# Patient Record
Sex: MALE | Race: OTHER | NOT HISPANIC OR LATINO | Employment: FULL TIME | URBAN - METROPOLITAN AREA
[De-identification: names, ages, dates, MRNs, and addresses within clinical notes are randomized per-mention and may not be internally consistent; named-entity substitution may affect disease eponyms.]

---

## 2024-09-03 ENCOUNTER — OFFICE VISIT (OUTPATIENT)
Dept: URGENT CARE | Facility: CLINIC | Age: 19
End: 2024-09-03
Payer: OTHER MISCELLANEOUS

## 2024-09-03 VITALS
HEIGHT: 75 IN | RESPIRATION RATE: 20 BRPM | SYSTOLIC BLOOD PRESSURE: 117 MMHG | WEIGHT: 170 LBS | HEART RATE: 96 BPM | DIASTOLIC BLOOD PRESSURE: 57 MMHG | BODY MASS INDEX: 21.14 KG/M2 | OXYGEN SATURATION: 96 % | TEMPERATURE: 99 F

## 2024-09-03 DIAGNOSIS — Z02.6 ENCOUNTER RELATED TO WORKER'S COMPENSATION CLAIM: ICD-10-CM

## 2024-09-03 DIAGNOSIS — B34.9 ACUTE VIRAL SYNDROME: Primary | ICD-10-CM

## 2024-09-03 LAB
CTP QC/QA: YES
SARS-COV-2 AG RESP QL IA.RAPID: NEGATIVE

## 2024-09-03 PROCEDURE — 99214 OFFICE O/P EST MOD 30 MIN: CPT | Mod: S$GLB,,, | Performed by: STUDENT IN AN ORGANIZED HEALTH CARE EDUCATION/TRAINING PROGRAM

## 2024-09-03 PROCEDURE — 87811 SARS-COV-2 COVID19 W/OPTIC: CPT | Mod: QW,S$GLB,, | Performed by: STUDENT IN AN ORGANIZED HEALTH CARE EDUCATION/TRAINING PROGRAM

## 2024-09-03 NOTE — PROGRESS NOTES
Subjective:      Patient ID: Patrice Barraza is a 19 y.o. male.    Chief Complaint: URI    Patient's place of employment - [employer]  Date of injury - 9/3/24  Injury Mechanism - Cold Symptoms   What they were doing when they got hurt - doing his daily duties  What they did immediately after - reported to [facility]   Pain scale right now - 5/10  EC    See MA note above. 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 Denies known sick contacts. Symptoms include headache, bodyaches, chills, fatigue, malaise, cough, nasal congestiona, and mild ear pressure. Onset of symptoms was yesterday. Taking ibuprofen for relief so far.     URI   Associated symptoms include coughing and sneezing.       Constitution: Positive for activity change, chills, fatigue, fever and generalized weakness.        Bodyaches   Cardiovascular:  Positive for sob on exertion.   Respiratory:  Positive for cough.    Musculoskeletal:  Positive for muscle cramps and muscle ache.   Skin:  Negative for erythema.   Allergic/Immunologic: Positive for sneezing.     Objective:     Physical Exam  Vitals and nursing note reviewed.   Constitutional:       General: He is not in acute distress.     Appearance: Normal appearance. He is ill-appearing. He is not toxic-appearing or diaphoretic.   HENT:      Head: Normocephalic.      Right Ear: Tympanic membrane, ear canal and external ear normal.      Left Ear: Tympanic membrane, ear canal and external ear normal.      Mouth/Throat:      Mouth: Mucous membranes are moist.      Pharynx: No oropharyngeal exudate or posterior oropharyngeal erythema.   Eyes:      Extraocular Movements: Extraocular movements intact.      Conjunctiva/sclera: Conjunctivae normal.      Pupils: Pupils are equal, round, and reactive to light.   Cardiovascular:      Rate and Rhythm: Normal rate and regular rhythm.   Pulmonary:      Effort: Pulmonary effort is normal. No respiratory distress.      Breath  sounds: Normal breath sounds.   Musculoskeletal:      Cervical back: Normal range of motion and neck supple.   Skin:     General: Skin is warm and dry.      Findings: No erythema.   Neurological:      Mental Status: He is alert and oriented to person, place, and time.      GCS: GCS eye subscore is 4. GCS verbal subscore is 5. GCS motor subscore is 6.   Psychiatric:         Attention and Perception: Attention normal.         Mood and Affect: Mood normal.         Behavior: Behavior normal.        Assessment:      1. Acute viral syndrome    2. Encounter related to worker's compensation claim      Results for orders placed or performed in visit on 09/03/24   SARS Coronavirus 2 Antigen, POCT Manual Read   Result Value Ref Range    SARS Coronavirus 2 Antigen Negative Negative     Acceptable Yes        Plan:     Discussed negative COVID test and reviewed that although negative, false positives are commone with antigen testing especially within the first 24 hours of symptoms. COVID precautions were given. We discussed acute viral syndromes and the expected course and duration of symptoms. Because of the contagious nature of his condition and working in close proximity to crew mates, he has been advised to isolate from the crew to reduce risk of infecting others. Reviewed OTC medications for symptom relief including ibuprofen, Delsym, pseduoephedrine, cepacol lozenges and Chloraseptic spray.  Patient inquires whether or not he has to go home and I left this up to him.  If he is able to isolate here prior to resuming work that is an acceptable alternative to going back home to [place].      Risks and benefits of any medication prescribed or recommended during this visit was explained, verbal instructions on use given. Clinic/Emergency department return precautions were given, can return to [facility] before scheduled follow-up appointment if notes worsening/aggravation of symptoms. All questions and concerns  were addressed prior to discharge. Plan was developed with active input from the patient and they verbalized understanding of and agreement with the POC.     Note was dictated with voice recognition software, please excuse any grammatical errors.           Restrictions:  (Needs return to work clearance prior to resuming work. May be seen by his personal physician in [place] or return to [facility])  Follow up in about 6 days (around 9/9/2024) for to urgent care for acutely worsening symptoms. .

## 2024-09-03 NOTE — LETTER
[facility] - [place] [address]  Phone: [phone]  Fax: [phone]  [facility]: [phone]    Pt Name: Patrice Barraza  Injury Date: 09/03/2024   Employee ID:  Date of First Treatment: 09/03/2024   Company: [employer]      Appointment Time: 01:45 PM Arrived: 1:50pm   Provider: Aster Peacock MD Time Out: 3:00pm     Office Treatment:   1. Acute viral syndrome    2. Encounter related to worker's compensation claim               Restrictions:  (Needs return to work clearance prior to resuming work. May be seen by his personal physician in [place] or return to [facility])     Return Appointment: 9/9/2024 at 9:00am.  EC

## 2024-09-16 ENCOUNTER — TELEPHONE (OUTPATIENT)
Dept: URGENT CARE | Facility: CLINIC | Age: 19
End: 2024-09-16
Payer: COMMERCIAL

## 2024-09-17 NOTE — TELEPHONE ENCOUNTER
Called patient and left a voice message and call back number regarding the missed [facility] Appointment.   AFG

## 2024-11-07 ENCOUNTER — HOSPITAL ENCOUNTER (EMERGENCY)
Facility: HOSPITAL | Age: 19
Discharge: HOME | End: 2024-11-07
Attending: EMERGENCY MEDICINE
Payer: COMMERCIAL

## 2024-11-07 VITALS
SYSTOLIC BLOOD PRESSURE: 129 MMHG | DIASTOLIC BLOOD PRESSURE: 72 MMHG | HEART RATE: 88 BPM | OXYGEN SATURATION: 100 % | RESPIRATION RATE: 16 BRPM | WEIGHT: 188 LBS | TEMPERATURE: 98 F | BODY MASS INDEX: 23.5 KG/M2

## 2024-11-07 DIAGNOSIS — H66.004 RECURRENT ACUTE SUPPURATIVE OTITIS MEDIA OF RIGHT EAR WITHOUT SPONTANEOUS RUPTURE OF TYMPANIC MEMBRANE: Primary | ICD-10-CM

## 2024-11-07 PROCEDURE — 99283 EMERGENCY DEPT VISIT LOW MDM: CPT

## 2024-11-07 RX ORDER — AMOXICILLIN AND CLAVULANATE POTASSIUM 875; 125 MG/1; MG/1
1 TABLET, FILM COATED ORAL 2 TIMES DAILY
Qty: 14 TABLET | Refills: 0 | Status: SHIPPED | OUTPATIENT
Start: 2024-11-07

## 2024-11-07 NOTE — ED PROVIDER NOTES
SCRIBE #1 NOTE: I, Elizabeth Zee, am scribing for, and in the presence of, Susan Aceves MD. I have scribed the entire note.       History     Chief Complaint   Patient presents with    Otalgia     Right ear pain onset 4 hrs ago; muffled hearing     Review of patient's allergies indicates:  No Known Allergies      History of Present Illness     HPI    11/7/2024, 6:55 AM  History obtained from the patient      History of Present Illness: Patrice Barraza is a 19 y.o. male patient who presents to the Emergency Department for evaluation of right ear pain and facial pain which onset gradually around 2 AM today. Pt's symptoms began worsening until he was unable to hear out of his right ear. He states he woke up at 12 AM with no symptoms. Pt states he has had many ear infections in the past. Symptoms are constant and moderate in severity. No mitigating or exacerbating factors reported. Patient denies any fever, sore throat, SOB, N/V, and all other sxs at this time. Prior Tx includes Advil with some relief of pain. No further complaints or concerns at this time.       Arrival mode: Personal vehicle      PCP: No, Primary Doctor        Past Medical History:  No past medical history on file.    Past Surgical History:  No past surgical history on file.      Family History:  Family History   Problem Relation Name Age of Onset    Testicular cancer Father         Social History:  Social History     Tobacco Use    Smoking status: Never     Passive exposure: Never    Smokeless tobacco: Never   Substance and Sexual Activity    Alcohol use: Never    Drug use: Never    Sexual activity: Not Currently        Review of Systems     Review of Systems   Constitutional:  Negative for fever.   HENT:  Positive for ear pain (right-sided) and hearing loss (right ear). Negative for sore throat.         (+) right-sided facial pain   Respiratory:  Negative for shortness of breath.    Cardiovascular:  Negative for chest pain.    Gastrointestinal:  Negative for nausea and vomiting.   Genitourinary:  Negative for dysuria.   Musculoskeletal:  Negative for back pain.   Skin:  Negative for rash.   Neurological:  Negative for weakness.   Hematological:  Does not bruise/bleed easily.   All other systems reviewed and are negative.       Physical Exam     Initial Vitals [11/07/24 0619]   BP Pulse Resp Temp SpO2   121/69 85 16 97.8 °F (36.6 °C) 98 %      MAP       --          Physical Exam  Nursing Notes and Vital Signs Reviewed.  Constitutional: Patient is in no acute distress. Well-developed and well-nourished.  Head: Atraumatic. Normocephalic.   Eyes: PERRL. EOM intact. Conjunctivae are not pale. No scleral icterus.  ENT: Mucous membranes are moist. Erythema to right ear canal. Diminished light reflex. Effusion behind right TM. No drainage. No tenderness over mastoid process.   Neck: Supple. Full ROM. No lymphadenopathy.  Cardiovascular: Regular rate. Regular rhythm. No murmurs, rubs, or gallops. Distal pulses are 2+ and symmetric.  Pulmonary/Chest: No respiratory distress. Clear to auscultation bilaterally. No wheezing or rales.  Abdominal: Soft and non-distended.  There is no tenderness.  No rebound, guarding, or rigidity. Good bowel sounds.  Genitourinary: No CVA tenderness.  Musculoskeletal: Moves all extremities. No obvious deformities. No edema. No calf tenderness.  Skin: Warm and dry.  Neurological:  Alert, awake, and appropriate.  Normal speech.  No acute focal neurological deficits are appreciated.  Psychiatric: Normal affect. Good eye contact. Appropriate in content.     ED Course   Procedures  ED Vital Signs:  Vitals:    11/07/24 0619 11/07/24 0630   BP: 121/69 129/72   Pulse: 85 88   Resp: 16    Temp: 97.8 °F (36.6 °C) 98 °F (36.7 °C)   TempSrc: Oral    SpO2: 98% 100%   Weight: 85.3 kg (188 lb)        Abnormal Lab Results:  Labs Reviewed - No data to display         Imaging Results:  Imaging Results    None                   The  Emergency Provider reviewed the vital signs and test results, which are outlined above.     ED Discussion       7:01 AM: Reassessed pt at this time.  Discussed with pt all pertinent ED information and results. Discussed pt dx and plan of tx. Gave pt all f/u and return to the ED instructions. All questions and concerns were addressed at this time. Pt expresses understanding of information and instructions, and is comfortable with plan to discharge. Pt is stable for discharge.    I discussed with patient and/or family/caretaker that evaluation in the ED does not suggest any emergent or life threatening medical conditions requiring immediate intervention beyond what was provided in the ED, and I believe patient is safe for discharge.  Regardless, an unremarkable evaluation in the ED does not preclude the development or presence of a serious of life threatening condition. As such, patient was instructed to return immediately for any worsening or change in current symptoms.         Medical Decision Making  DDX: 1. Otitis Media 2. Ruptured TM 3. URI    Exam consistent with otitis media, will start on po antibiotics, outpatient follow up with ENT if no improvement.     Risk  Prescription drug management.                ED Medication(s):  Medications - No data to display    Discharge Medication List as of 11/7/2024  6:55 AM        START taking these medications    Details   amoxicillin-clavulanate 875-125mg (AUGMENTIN) 875-125 mg per tablet Take 1 tablet by mouth 2 (two) times daily., Starting Thu 11/7/2024, Normal              Follow-up Information       Otolaryngology. Schedule an appointment as soon as possible for a visit in 2 days.    Specialty: Otolaryngology  Why: Return to the Emergency Room, If symptoms worsen  Contact information:  [id number] [facility] [id number] [phone]                               Scribe Attestation:   Scribe #1: I performed the above scribed service and the  documentation accurately describes the services I performed. I attest to the accuracy of the note.     Attending:   Physician Attestation Statement for Scribe #1: I, Susan Aceves MD, personally performed the services described in this documentation, as scribed by Elizabeth Zee, in my presence, and it is both accurate and complete.           Clinical Impression       ICD-10-CM ICD-9-CM   1. Recurrent acute suppurative otitis media of right ear without spontaneous rupture of tympanic membrane  H66.004 382.00       Disposition:   Disposition: Discharged  Condition: Stable         Susan Aceves MD  11/11/24 0625

## 2024-11-07 NOTE — Clinical Note
"Patrice "Patrice" Stone was seen and treated in our emergency department on 11/7/2024.  He may return to work on 11/07/2024.       If you have any questions or concerns, please don't hesitate to call.      Susan Aceves MD"

## 2024-11-07 NOTE — Clinical Note
"Patrice "Patrice" Stone was seen and treated in our emergency department on 11/7/2024.  He may return to work on 11/08/2024.       If you have any questions or concerns, please don't hesitate to call.      Laina De Leon RN    "